# Patient Record
Sex: FEMALE | Race: WHITE | Employment: UNEMPLOYED | URBAN - METROPOLITAN AREA
[De-identification: names, ages, dates, MRNs, and addresses within clinical notes are randomized per-mention and may not be internally consistent; named-entity substitution may affect disease eponyms.]

---

## 2023-03-09 ENCOUNTER — HOSPITAL ENCOUNTER (EMERGENCY)
Age: 33
Discharge: HOME | End: 2023-03-09
Attending: EMERGENCY MEDICINE | Admitting: EMERGENCY MEDICINE
Payer: COMMERCIAL

## 2023-03-09 ENCOUNTER — APPOINTMENT (OUTPATIENT)
Dept: GENERAL RADIOLOGY | Age: 33
End: 2023-03-09
Payer: COMMERCIAL

## 2023-03-09 VITALS
DIASTOLIC BLOOD PRESSURE: 79 MMHG | OXYGEN SATURATION: 99 % | RESPIRATION RATE: 16 BRPM | BODY MASS INDEX: 28.32 KG/M2 | HEART RATE: 75 BPM | SYSTOLIC BLOOD PRESSURE: 128 MMHG | TEMPERATURE: 98.2 F | WEIGHT: 150 LBS | HEIGHT: 61 IN

## 2023-03-09 DIAGNOSIS — S83.412A SPRAIN OF MEDIAL COLLATERAL LIGAMENT OF LEFT KNEE, INITIAL ENCOUNTER: Primary | ICD-10-CM

## 2023-03-09 PROCEDURE — 73562 X-RAY EXAM OF KNEE 3: CPT

## 2023-03-09 PROCEDURE — 99283 EMERGENCY DEPT VISIT LOW MDM: CPT | Performed by: EMERGENCY MEDICINE

## 2023-03-09 RX ORDER — IBUPROFEN 800 MG/1
800 TABLET ORAL EVERY 8 HOURS PRN
Qty: 30 TABLET | Refills: 0 | Status: SHIPPED | OUTPATIENT
Start: 2023-03-09

## 2023-03-09 ASSESSMENT — PAIN - FUNCTIONAL ASSESSMENT
PAIN_FUNCTIONAL_ASSESSMENT: 0-10
PAIN_FUNCTIONAL_ASSESSMENT: 0-10

## 2023-03-09 ASSESSMENT — PAIN SCALES - GENERAL: PAINLEVEL_OUTOF10: 8

## 2023-03-10 NOTE — ED TRIAGE NOTES
Pt to ED with c/o left knee pain. Pt states fell onto knee yesterday while skating. Pt states unable to bear weight. Pt states swelling. Pt alert and in NAD at this time. Otc Regimen: Eucern eczema cream apply daily to body areas\\nDML moisturizer for face Detail Level: Zone

## 2023-03-10 NOTE — ED NOTES
I have reviewed discharge instructions with the patient. The patient verbalized understanding. Patient left ED via Discharge Method: wheelchair to Home with family. Opportunity for questions and clarification provided. Patient given 1 scripts. To continue your aftercare when you leave the hospital, you may receive an automated call from our care team to check in on how you are doing.  This is a free service and part of our promise to provide the best care and service to meet your aftercare needs. \" If you have questions, or wish to unsubscribe from this service please call [phone].  Thank you for Choosing our [facility].         Magali Ivy RN  03/09/23 [id number]

## 2023-03-10 NOTE — ED PROVIDER NOTES
Emergency Department Provider Note                   PCP:                No primary care provider on file. Age: 33 y.o. Sex: female     [address] [facility]    1. Sprain of medial collateral ligament of left knee, initial encounter  S83.412A [facility]TH[employer] SUPPLIES Knee Immobilizer, Left; 24\"          MEDICAL DECISION MAKING  Complexity of Problems Addressed:  1 acute uncomplicated illness/injury    Data Reviewed and Analyzed:  Category 1:     I ordered each unique test.  I reviewed the results of each unique test.        Category 2:   I independently ordered and reviewed the X-rays. No evidence of fracture or dislocation    Category 3: Discussion of management or test interpretation. 20-year-old female with left knee pain primarily in the medial aspect after a fall on skates yesterday. X-rays negative for acute abnormality, will be given a knee immobilizer and ibuprofen 800 mg for pain. Instructed weight-bear as tolerated, wear the brace when ambulating, and follow-up for recheck 1 week with family doctor or the orthopedist.       Risk of Complications and/or Morbidity of Patient Management:  Prescription drug management performed     Kuldeep Vanessa is a 33 y.o. female who presents to the Emergency Department with chief complaint of    Chief Complaint   Patient presents with    Knee Pain      20-year-old  female complains of left knee pain after a fall while rollerskating yesterday to the left knee. Patient believes she did more of a twisting motion and direct trauma. Complains of pain to the medial knee and increased pain with attempts to weight-bear or walk. She denies any paralysis or paresthesias. Pain is worse with movement and improved with remaining still. The history is provided by the patient. Review of Systems   Constitutional:  Negative for chills and fever. Musculoskeletal:  Positive for arthralgias.      Vitals signs and nursing note reviewed. Patient Vitals for the past 4 hrs:   Temp Pulse Resp BP SpO2   03/09/23 1947 -- -- -- 128/79 --   03/09/23 1945 98.2 °F (36.8 °C) 75 16 -- 99 %          Physical Exam  Vitals and nursing note reviewed. Constitutional:       General: She is not in acute distress. HENT:      Head: Normocephalic and atraumatic. Right Ear: External ear normal.      Left Ear: External ear normal.      Nose: Nose normal.      Mouth/Throat:      Mouth: Mucous membranes are moist.   Eyes:      Extraocular Movements: Extraocular movements intact. Conjunctiva/sclera: Conjunctivae normal.      Pupils: Pupils are equal, round, and reactive to light. Cardiovascular:      Pulses: Normal pulses. Musculoskeletal:         General: Tenderness present. Cervical back: Normal range of motion and neck supple. Left knee: No swelling, deformity, effusion, erythema or ecchymosis. Decreased range of motion. Tenderness present over the medial joint line and MCL. No patellar tendon tenderness. Normal alignment and normal patellar mobility. Right lower leg: No edema. Left lower leg: No edema. Skin:     General: Skin is warm and dry. Neurological:      General: No focal deficit present. Mental Status: She is alert and oriented to person, place, and time. Psychiatric:         Mood and Affect: Mood and affect normal.         Speech: Speech normal.        Procedures     Orders Placed This Encounter   Procedures    XR KNEE LEFT (3 VIEWS)    [facility] ORTHOPEDIC SUPPLIES Knee Immobilizer, Left; 24\"        Medications - No data to display    New Prescriptions    No medications on file        History reviewed. No pertinent past medical history. Past Surgical History:   Procedure Laterality Date    APPENDECTOMY      KNEE SURGERY Right         History reviewed. No pertinent family history.      Social History     Tobacco Use    Smoking status: Every Day     Packs/day: 0.50     Types: Cigarettes    Smokeless tobacco: Never   Vaping Use    Vaping Use: Never used   Substance and Sexual Activity    Alcohol use: Not Currently    Drug use: Yes     Types: Marijuana Charmayne Stai)        Allergies: Patient has no known allergies. Previous Medications    No medications on file          Results Reviewed:  XR KNEE LEFT (3 VIEWS)   Final Result   No plain film evidence of acute fracture or dislocation. Thank you for the referral of this patient. This exam was interpreted by an    [employer] Board of Rad[employer] certified radiologist with subspecialty fellowship    training in [place] [id number]. If there are any questions regarding this exam please    feel free to contact a radiologist directly at [phone]. Orville Gonzalez MD    3/9/2023 8:38:00 PM            I discussed the results of all labs, procedures, radiographs, and treatments with the patient and available family. Treatment plan is agreed upon and the patient is ready for discharge. All voiced understanding of the discharge plan and medication instructions or changes as appropriate. Questions about treatment in the ED were answered. All were encouraged to return should symptoms worsen or new problems develop. Voice dictation software was used during the making of this note. This software is not perfect and grammatical and other typographical errors may be present. This note has not been completely proofread for errors.      Serafin Phillips MD  03/09/23 [id number]

## 2023-03-10 NOTE — DISCHARGE INSTRUCTIONS
Recommend wearing knee immobilizer with ambulation for the next 5 to 7 days. Recheck with orthopedics or your family doctor in 1 week for repeat exam and possible scheduling of an outpatient MRI.